# Patient Record
Sex: FEMALE | Race: WHITE
[De-identification: names, ages, dates, MRNs, and addresses within clinical notes are randomized per-mention and may not be internally consistent; named-entity substitution may affect disease eponyms.]

---

## 2019-03-13 ENCOUNTER — HOSPITAL ENCOUNTER (OUTPATIENT)
Dept: HOSPITAL 92 - BICMAMMO | Age: 74
Discharge: HOME | End: 2019-03-13
Payer: MEDICARE

## 2019-03-13 DIAGNOSIS — Z12.31: Primary | ICD-10-CM

## 2019-03-13 PROCEDURE — 77063 BREAST TOMOSYNTHESIS BI: CPT

## 2019-03-13 PROCEDURE — 77067 SCR MAMMO BI INCL CAD: CPT

## 2019-04-11 NOTE — MMO
Bilateral MAMMO Bilat Screen [facility].

 

CLINICAL HISTORY:

Patient is 74 years old and is seen for screening. The patient has no family

history of breast cancer.  The patient has no personal history of cancer. The

patient has a history of right Excisional Biopsy in 

2000 - benign.

 

VIEWS:

The views performed were:  bilateral craniocaudal with tomosynthesis and

bilateral mediolateral oblique with tomosynthesis.

 

FILMS COMPARED:

The present examination has been compared to a prior imaging study performed at

[facility] on 08/30/2010.

 

MAMMOGRAM FINDINGS:

The breasts are heterogeneously dense, which could obscure a lesion on

mammography.

 

There are stable benign appearing calcifications seen in both breasts.

 

There are no suspicious masses, suspicious calcifications, or new areas of

architectural distortion.

 

IMPRESSION:

THERE IS NO MAMMOGRAPHIC EVIDENCE OF MALIGNANCY.

 

A ROUTINE FOLLOW-UP MAMMOGRAM IN 1 YEAR IS RECOMMENDED.

 

THE RESULTS OF THIS EXAM WERE SENT TO THE PATIENT.

 

ACR BI-RADS Category 2 - Benign finding

 

MAMMOGRAPHY NOTE:

 1. A negative mammogram report should not delay a biopsy if a dominant of

 clinically suspicious mass is present.

 2. Approximately 10% to 15% of breast cancers are not detected by

 mammography.

 3. Adenosis and dense breasts may obscure an underlying neoplasm.

## 2020-03-02 ENCOUNTER — HOSPITAL ENCOUNTER (EMERGENCY)
Dept: HOSPITAL 18 - NAV ERS | Age: 75
LOS: 1 days | Discharge: HOME | End: 2020-03-03
Payer: MEDICARE

## 2020-03-02 DIAGNOSIS — L50.0: Primary | ICD-10-CM

## 2020-03-02 DIAGNOSIS — E03.9: ICD-10-CM

## 2020-03-02 DIAGNOSIS — I10: ICD-10-CM

## 2020-03-02 DIAGNOSIS — E11.9: ICD-10-CM

## 2020-03-02 PROCEDURE — 99282 EMERGENCY DEPT VISIT SF MDM: CPT

## 2020-10-08 ENCOUNTER — HOSPITAL ENCOUNTER (OUTPATIENT)
Dept: HOSPITAL 92 - BICULT | Age: 75
End: 2020-10-08
Payer: MEDICARE

## 2020-10-08 DIAGNOSIS — C50.311: Primary | ICD-10-CM

## 2020-10-08 PROCEDURE — 0H9T3ZX DRAINAGE OF RIGHT BREAST, PERCUTANEOUS APPROACH, DIAGNOSTIC: ICD-10-PCS

## 2020-10-08 PROCEDURE — 19084 BX BREAST ADD LESION US IMAG: CPT

## 2020-10-08 PROCEDURE — 88305 TISSUE EXAM BY PATHOLOGIST: CPT

## 2020-10-08 PROCEDURE — 88342 IMHCHEM/IMCYTCHM 1ST ANTB: CPT

## 2020-10-08 PROCEDURE — 19083 BX BREAST 1ST LESION US IMAG: CPT

## 2020-10-08 PROCEDURE — 88341 IMHCHEM/IMCYTCHM EA ADD ANTB: CPT

## 2020-10-08 NOTE — MMO
MAMMOGRAM FINDINGS:

The breast is heterogeneously dense, which could obscure a lesion on mammography.

 

Inner lower Left breast

 

IMPRESSION:

FINDING IN THE RIGHT BREAST IS CONFIRMED UTILIZING POST PROCEDURE MAMMOGRAM.

 

 

Reported by: CUCA GALLOWAY MD

Electonically Signed: [id number]

## 2020-10-08 NOTE — ULT
US Breast Bx US Guided



History: Breast masses



Comparison: Right breast ultrasound 9/28/2020



Findings: Patient was brought to the ultrasound suite. All questions were answered. Informed consent 
was obtained. Timeout performed.



Right breast was prepped and draped in normal sterile fashion. 3 mL instilled into the superficial an
d deep soft tissues adjacent to both upper inner quadrant masses.



The first biopsy was of the smaller breast mass 3-4:00 2 cm from the nipple. Total of 3 cores were ob
tained. A clip was placed and it is the " R" clip.



Next the larger mass retroareolar region was biopsy with a total of 3 cores obtained. A clip was plac
ed and it is the "S" clip.



Impression: Technically successful ultrasound-guided right breast mass biopsy x2.



Reported By: Yang Avery 

Electronically Signed:  10/8/2020 2:54 PM

## 2020-11-02 ENCOUNTER — HOSPITAL ENCOUNTER (OUTPATIENT)
Dept: HOSPITAL 92 - LABBT | Age: 75
Discharge: HOME | End: 2020-11-02
Attending: SURGERY
Payer: MEDICARE

## 2020-11-02 DIAGNOSIS — Z01.818: Primary | ICD-10-CM

## 2020-11-02 DIAGNOSIS — C50.919: ICD-10-CM

## 2020-11-02 DIAGNOSIS — Z20.828: ICD-10-CM

## 2020-11-02 LAB
ANION GAP SERPL CALC-SCNC: 16 MMOL/L (ref 10–20)
BASOPHILS # BLD AUTO: 0.1 10X3/UL (ref 0–0.2)
BASOPHILS NFR BLD AUTO: 0.9 % (ref 0–2)
BUN SERPL-MCNC: 15 MG/DL (ref 9.8–20.1)
CALCIUM SERPL-MCNC: 9.8 MG/DL (ref 7.8–10.44)
CHLORIDE SERPL-SCNC: 103 MMOL/L (ref 98–107)
CO2 SERPL-SCNC: 26 MMOL/L (ref 23–31)
CREAT CL PREDICTED SERPL C-G-VRATE: 0 ML/MIN (ref 70–130)
EOSINOPHIL # BLD AUTO: 0.1 10X3/UL (ref 0–0.5)
EOSINOPHIL NFR BLD AUTO: 1.6 % (ref 0–6)
GLUCOSE SERPL-MCNC: 91 MG/DL (ref 83–110)
HGB BLD-MCNC: 13.1 G/DL (ref 12–16)
LYMPHOCYTES NFR BLD AUTO: 16.5 % (ref 18–47)
MCH RBC QN AUTO: 29.9 PG (ref 27–33)
MCV RBC AUTO: 93.2 FL (ref 80–100)
MONOCYTES # BLD AUTO: 0.7 10X3/UL (ref 0–1.1)
MONOCYTES NFR BLD AUTO: 7.9 % (ref 0–10)
NEUTROPHILS # BLD AUTO: 6.3 10X3/UL (ref 1.5–8.4)
NEUTROPHILS NFR BLD AUTO: 72.8 % (ref 40–75)
PLATELET # BLD AUTO: 340 10X3/UL (ref 130–400)
POTASSIUM SERPL-SCNC: 4.1 MMOL/L (ref 3.5–5.1)
RBC # BLD AUTO: 4.38 10X6/UL (ref 3.9–5.2)
SODIUM SERPL-SCNC: 141 MMOL/L (ref 136–145)
WBC # BLD AUTO: 8.7 10X3/UL (ref 4.5–11)

## 2020-11-02 PROCEDURE — 87635 SARS-COV-2 COVID-19 AMP PRB: CPT

## 2020-11-02 PROCEDURE — 93010 ELECTROCARDIOGRAM REPORT: CPT

## 2020-11-02 PROCEDURE — 80048 BASIC METABOLIC PNL TOTAL CA: CPT

## 2020-11-02 PROCEDURE — 93005 ELECTROCARDIOGRAM TRACING: CPT

## 2020-11-02 PROCEDURE — U0003 INFECTIOUS AGENT DETECTION BY NUCLEIC ACID (DNA OR RNA); SEVERE ACUTE RESPIRATORY SYNDROME CORONAVIRUS 2 (SARS-COV-2) (CORONAVIRUS DISEASE [COVID-19]), AMPLIFIED PROBE TECHNIQUE, MAKING USE OF HIGH THROUGHPUT TECHNOLOGIES AS DESCRIBED BY CMS-2020-01-R: HCPCS

## 2020-11-02 PROCEDURE — 85025 COMPLETE CBC W/AUTO DIFF WBC: CPT

## 2020-11-02 NOTE — EKG
Test Reason : PREOP

Blood Pressure : ***/*** mmHG

Vent. Rate : 078 BPM     Atrial Rate : 078 BPM

   P-R Int : 158 ms          QRS Dur : 072 ms

    QT Int : 376 ms       P-R-T Axes : 080 073 069 degrees

   QTc Int : 428 ms

 

Normal sinus rhythm with sinus arrhythmia

Normal ECG

No previous ECGs available

Confirmed by RAFAELA SHIELDS ([id number]) on 11/2/2020 9:08:27 PM

 

Referred By:  INDIRA MONROY           Confirmed By:RAFAELA SHIELDS

## 2020-11-05 ENCOUNTER — HOSPITAL ENCOUNTER (OUTPATIENT)
Dept: HOSPITAL 92 - SDC | Age: 75
Discharge: HOME | End: 2020-11-05
Attending: SURGERY
Payer: MEDICARE

## 2020-11-05 VITALS — BODY MASS INDEX: 27.9 KG/M2

## 2020-11-05 DIAGNOSIS — Z79.84: ICD-10-CM

## 2020-11-05 DIAGNOSIS — Z17.0: ICD-10-CM

## 2020-11-05 DIAGNOSIS — D24.1: ICD-10-CM

## 2020-11-05 DIAGNOSIS — Z79.82: ICD-10-CM

## 2020-11-05 DIAGNOSIS — Z79.899: ICD-10-CM

## 2020-11-05 DIAGNOSIS — E03.9: ICD-10-CM

## 2020-11-05 DIAGNOSIS — I10: ICD-10-CM

## 2020-11-05 DIAGNOSIS — E11.9: ICD-10-CM

## 2020-11-05 DIAGNOSIS — Z87.891: ICD-10-CM

## 2020-11-05 DIAGNOSIS — C50.911: Primary | ICD-10-CM

## 2020-11-05 DIAGNOSIS — N60.91: ICD-10-CM

## 2020-11-05 PROCEDURE — 88342 IMHCHEM/IMCYTCHM 1ST ANTB: CPT

## 2020-11-05 PROCEDURE — 38525 BIOPSY/REMOVAL LYMPH NODES: CPT

## 2020-11-05 PROCEDURE — 07B50ZX EXCISION OF RIGHT AXILLARY LYMPHATIC, OPEN APPROACH, DIAGNOSTIC: ICD-10-PCS | Performed by: SURGERY

## 2020-11-05 PROCEDURE — 88341 IMHCHEM/IMCYTCHM EA ADD ANTB: CPT

## 2020-11-05 PROCEDURE — 88307 TISSUE EXAM BY PATHOLOGIST: CPT

## 2020-11-05 PROCEDURE — 19281 PERQ DEVICE BREAST 1ST IMAG: CPT

## 2020-11-05 PROCEDURE — 0HBT0ZZ EXCISION OF RIGHT BREAST, OPEN APPROACH: ICD-10-PCS | Performed by: SURGERY

## 2020-11-05 PROCEDURE — 38900 IO MAP OF SENT LYMPH NODE: CPT

## 2020-11-05 PROCEDURE — 78195 LYMPH SYSTEM IMAGING: CPT

## 2020-11-05 PROCEDURE — A9541 TC99M SULFUR COLLOID: HCPCS

## 2020-11-05 PROCEDURE — S0020 INJECTION, BUPIVICAINE HYDRO: HCPCS

## 2020-11-05 PROCEDURE — 19282 PERQ DEVICE BREAST EA IMAG: CPT

## 2020-11-05 PROCEDURE — 19301 PARTIAL MASTECTOMY: CPT

## 2020-11-05 PROCEDURE — 76098 X-RAY EXAM SURGICAL SPECIMEN: CPT

## 2020-11-05 NOTE — MMO
Surgical specimen mammography



HISTORY: Right breast cancer.



FINDINGS: Mammographic evaluation of the surgical specimen obtained by Dr. Yadav shows one of the l
ocalization wires and the ribbon shaped metallic clip, along with calcifications to overlie the

specimen tissue. The S shaped clip is not visible in this specimen radiograph.



Findings were called to Dr. Yadav in the OR at time of the exam.



Reported By: INDIRA Ramirez 

Electronically Signed:  11/5/2020 12:02 PM

## 2020-11-05 NOTE — MMO
Mammographic guided Needle localization right breast cancer x2



HISTORY: Right breast cancer.



FINDINGS: After explaining the procedure and answering all questions, the localization clips associat
ed with recent right breast biopsy were visualized from an inferior approach.



Sterile technique, buffered local anesthesia, mammographic guidance, and an anterior approach were us
ed to carefully advance a 5 cm [place] needle and wire immediately adjacent to the more medial and

less inferior localization clip (ribbon). Wire was deployed to hold position.



A new 3 cm [place] needle and wire and buffered local anesthesia were placed immediately adjacent to th
e less medial and more inferior localization clip (S).



Final images were marked. Patient tolerated the procedure well and was transferred to day surgery in 
good condition.



  



IMPRESSION :

Technically successful needle localization right breast masses and localization clips.



Reported By: INDIRA Ramirez 

Electronically Signed:  11/5/2020 8:56 AM

## 2020-11-06 NOTE — NM
Lymphoscintigraphy right breast



HISTORY: Right breast cancer.



FINDINGS: A total volume of 1 cc containing 404 uCi technetium 99m filtered sulfur colloid was inject
ed into the skin at the 12:00, 3:00, 6:00, and 9:00 periareolar position of the right breast.

Injection sites were massaged by the patient. Immediate imaging shows focal area of uptake at the axi
llary tail of the right breast. Slightly less intense (3) foci were seen immediately anterior and

medial to the dominant sentinel node.



Patient tolerated the procedure well and was transferred to day surgery in good condition.



  



IMPRESSION :

Technically successful right breast lymphoscintigraphy, revealing lymph nodes at the axillary tail of
 the right breast.



Reported By: INDIRA Ramirez 

Electronically Signed:  11/5/2020 8:58 AM

## 2020-11-06 NOTE — OP
DATE OF PROCEDURE:  11/05/2020



PREOPERATIVE DIAGNOSIS:  Right breast cancer.



POSTOPERATIVE DIAGNOSIS:  Right breast cancer.



PROCEDURES PERFORMED:  

1. Right breast partial mastectomy after needle localization.

2. Right axillary sentinel node biopsy (sentinel node protocol).



ANESTHESIA:  General.



ESTIMATED BLOOD LOSS:  Minimal.



COMPLICATIONS:  None.



SPECIMEN:  The breast mass was marked with 2 short superior and one long lateral and

sent to Path for final diagnosis.  Additional margins were sent superiorly,

inferiorly, anterior, posterior, medial, and lateral. 



DESCRIPTION OF PROCEDURE:  The patient had undergone preop placement of needle

localization wire x2 as well as lymphoscintigraphy was performed showing uptake in

the right axilla.  The patient was then taken to the operating room and after

general anesthetic, 5 mL of methylene blue dye was infiltrated on the right nipple

and massaged for 5 minutes.  The right breast, axilla, arm were all prepped and

draped in a sterile fashion.  An incision was made in the inferior hairline of the

right axilla.  Neoprobe was used to find an area of increased uptake.  A blue node

was removed.  It had high counts on the back table.  Background counts dropped to

near zero.  This was the sentinel node.  This wound was irrigated and closed using

3-0 Vicryl, 4-0 Monocryl, and Dermabond. 



The incision was made along the medial aspect of the areola on the patient's right

breast.  Flaps were raised superiorly, inferolaterally, posteriorly to the 2 needle

localization wires.  Specimen x-ray revealed the clip present in the middle of the

abnormality to be present.  The clip on the periphery was not and this would

correspond to the anterior margin.  Additional margins were then taken on all sides

of the tumor excision including anteriorly leaving really only dermis and a small

amount of subcutaneous fat anteriorly.  All these margins were marked with stitch on

new margin and sent to Path for final diagnosis.  The wound was irrigated.  There

was no bleeding.  Local anesthetic was applied and the wound was closed using 3-0

Vicryl, 4-0 Monocryl, and Dermabond.  The patient was sent to Recovery in stable

condition.  All instrument counts, needle counts, and lap counts were correct. 







Job ID:  [id number]

## 2020-11-25 ENCOUNTER — HOSPITAL ENCOUNTER (OUTPATIENT)
Dept: HOSPITAL 92 - LABBT | Age: 75
Discharge: HOME | End: 2020-11-25
Attending: SURGERY
Payer: MEDICARE

## 2020-11-25 DIAGNOSIS — Z20.828: ICD-10-CM

## 2020-11-25 DIAGNOSIS — C50.919: ICD-10-CM

## 2020-11-25 DIAGNOSIS — Z01.812: Primary | ICD-10-CM

## 2020-11-25 PROCEDURE — U0003 INFECTIOUS AGENT DETECTION BY NUCLEIC ACID (DNA OR RNA); SEVERE ACUTE RESPIRATORY SYNDROME CORONAVIRUS 2 (SARS-COV-2) (CORONAVIRUS DISEASE [COVID-19]), AMPLIFIED PROBE TECHNIQUE, MAKING USE OF HIGH THROUGHPUT TECHNOLOGIES AS DESCRIBED BY CMS-2020-01-R: HCPCS

## 2020-11-25 PROCEDURE — 87635 SARS-COV-2 COVID-19 AMP PRB: CPT

## 2020-11-30 ENCOUNTER — HOSPITAL ENCOUNTER (OUTPATIENT)
Dept: HOSPITAL 92 - SDC | Age: 75
Discharge: HOME | End: 2020-11-30
Attending: SURGERY
Payer: MEDICARE

## 2020-11-30 VITALS — BODY MASS INDEX: 27.1 KG/M2

## 2020-11-30 DIAGNOSIS — Z79.84: ICD-10-CM

## 2020-11-30 DIAGNOSIS — Z85.3: ICD-10-CM

## 2020-11-30 DIAGNOSIS — E03.9: ICD-10-CM

## 2020-11-30 DIAGNOSIS — Z79.899: ICD-10-CM

## 2020-11-30 DIAGNOSIS — E11.9: ICD-10-CM

## 2020-11-30 DIAGNOSIS — I10: ICD-10-CM

## 2020-11-30 DIAGNOSIS — Z79.82: ICD-10-CM

## 2020-11-30 DIAGNOSIS — Z87.891: ICD-10-CM

## 2020-11-30 DIAGNOSIS — N60.31: Primary | ICD-10-CM

## 2020-11-30 DIAGNOSIS — N60.91: ICD-10-CM

## 2020-11-30 PROCEDURE — 88307 TISSUE EXAM BY PATHOLOGIST: CPT

## 2020-11-30 PROCEDURE — 0HBT0ZZ EXCISION OF RIGHT BREAST, OPEN APPROACH: ICD-10-PCS | Performed by: SURGERY

## 2020-11-30 PROCEDURE — S0020 INJECTION, BUPIVICAINE HYDRO: HCPCS

## 2020-12-01 NOTE — OP
DATE OF PROCEDURE:  11/30/2020



PREOPERATIVE DIAGNOSIS:  Right breast cancer with positive anterior and inferior

margins. 



POSTOPERATIVE DIAGNOSIS:  Right breast cancer with positive anterior and inferior

margins. 



PROCEDURE PERFORMED:  Reexcision margin lumpectomy.



ANESTHESIA:  General.



ESTIMATED BLOOD LOSS:  Minimal.



COMPLICATIONS:  None.



SPECIMENS:  Anterior margin with stitch on new margin, inferior margin with stitch

on new margin. 



BRIEF HISTORY:  Patient is a 75-year-old female who is status post right partial

mastectomy and sentinel node biopsy.  She was found to have a 1.8 cm tumor in her

right breast.  She had involvement of the both the anterior and inferior margins on

that lumpectomy.  She is being taken back for re-excision of these margins. 



TECHNIQUE:  The patient was taken to the operating room and laid supine on the

operating room table.  After general anesthetic was obtained, the right breast was

prepped and draped in a sterile fashion.  The previous circumareolar incision was

reopened.  There is sufficient anterior tissue to be taken as an additional margin.

This was taken to the level of the dermis down into the biopsy cavity.  A stitch was

placed on 

new margin, sent to Path for final diagnosis.  An additional inferior margin was

taken in the same manner with stitch on new margin.  The wound was irrigated.  Local

anesthetic was applied and the breast incision was reclosed using 3-0 Vicryl, 4-0

Monocryl, and Dermabond.  The patient was sent to Recovery in stable condition.  All

instrument counts, needle counts, and lap counts were correct. 





Job ID:  [id number]

## 2021-06-30 ENCOUNTER — HOSPITAL ENCOUNTER (OUTPATIENT)
Dept: HOSPITAL 92 - BICMAMMO | Age: 76
Discharge: HOME | End: 2021-06-30
Attending: INTERNAL MEDICINE
Payer: MEDICARE

## 2021-06-30 DIAGNOSIS — Z78.0: ICD-10-CM

## 2021-06-30 DIAGNOSIS — T38.6X5A: ICD-10-CM

## 2021-06-30 DIAGNOSIS — Z13.820: Primary | ICD-10-CM

## 2021-06-30 DIAGNOSIS — M85.89: ICD-10-CM

## 2021-06-30 PROCEDURE — 77080 DXA BONE DENSITY AXIAL: CPT

## 2021-08-26 ENCOUNTER — HOSPITAL ENCOUNTER (EMERGENCY)
Dept: HOSPITAL 18 - NAV ERS | Age: 76
Discharge: HOME | End: 2021-08-26
Payer: MEDICARE

## 2021-08-26 DIAGNOSIS — I10: ICD-10-CM

## 2021-08-26 DIAGNOSIS — Z79.82: ICD-10-CM

## 2021-08-26 DIAGNOSIS — Z79.84: ICD-10-CM
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
## 2021-08-26 DIAGNOSIS — E11.9: ICD-10-CM

## 2021-08-26 DIAGNOSIS — E03.9: ICD-10-CM

## 2021-08-26 DIAGNOSIS — Z79.01: ICD-10-CM

## 2021-08-26 DIAGNOSIS — Z85.3: ICD-10-CM

## 2021-08-26 DIAGNOSIS — S32.019A: Primary | ICD-10-CM

## 2021-08-26 DIAGNOSIS — Z79.899: ICD-10-CM

## 2021-08-26 PROCEDURE — 72170 X-RAY EXAM OF PELVIS: CPT

## 2021-08-26 PROCEDURE — 36416 COLLJ CAPILLARY BLOOD SPEC: CPT

## 2021-08-26 PROCEDURE — 72100 X-RAY EXAM L-S SPINE 2/3 VWS: CPT

## 2021-09-01 ENCOUNTER — HOSPITAL ENCOUNTER (INPATIENT)
Dept: HOSPITAL 18 - NAV ACUTE | Age: 76
LOS: 2 days | Discharge: ANOTHER HOSPITAL | DRG: 552 | End: 2021-09-03
Attending: STUDENT IN AN ORGANIZED HEALTH CARE EDUCATION/TRAINING PROGRAM | Admitting: STUDENT IN AN ORGANIZED HEALTH CARE EDUCATION/TRAINING PROGRAM
Payer: MEDICARE

## 2021-09-01 VITALS — BODY MASS INDEX: 28.9 KG/M2

## 2021-09-01 DIAGNOSIS — E03.9: ICD-10-CM
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
## 2021-09-01 DIAGNOSIS — S32.019A: Primary | ICD-10-CM

## 2021-09-01 DIAGNOSIS — M85.80: ICD-10-CM

## 2021-09-01 DIAGNOSIS — Z79.84: ICD-10-CM

## 2021-09-01 DIAGNOSIS — C50.919: ICD-10-CM

## 2021-09-01 DIAGNOSIS — E11.9: ICD-10-CM
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
## 2021-09-01 DIAGNOSIS — Z20.822: ICD-10-CM

## 2021-09-01 DIAGNOSIS — F41.9: ICD-10-CM

## 2021-09-01 DIAGNOSIS — F32.9: ICD-10-CM

## 2021-09-01 DIAGNOSIS — Z79.899: ICD-10-CM

## 2021-09-01 DIAGNOSIS — Z98.890: ICD-10-CM
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
## 2021-09-01 DIAGNOSIS — I10: ICD-10-CM

## 2021-09-01 PROCEDURE — U0002 COVID-19 LAB TEST NON-CDC: HCPCS

## 2021-09-01 PROCEDURE — 80048 BASIC METABOLIC PNL TOTAL CA: CPT

## 2021-09-01 PROCEDURE — 36416 COLLJ CAPILLARY BLOOD SPEC: CPT

## 2021-09-01 PROCEDURE — 85025 COMPLETE CBC W/AUTO DIFF WBC: CPT

## 2021-09-01 PROCEDURE — 83735 ASSAY OF MAGNESIUM: CPT

## 2021-09-01 PROCEDURE — 72131 CT LUMBAR SPINE W/O DYE: CPT

## 2021-09-01 RX ADMIN — HYDROCODONE BITARTRATE AND ACETAMINOPHEN PRN TAB: 5; 325 TABLET ORAL at 23:52

## 2021-09-01 RX ADMIN — HYDROCODONE BITARTRATE AND ACETAMINOPHEN PRN TAB: 5; 325 TABLET ORAL at 16:10

## 2021-09-02 LAB
ANION GAP SERPL CALC-SCNC: 14 MMOL/L (ref 10–20)
ANION GAP SERPL CALC-SCNC: 16 MMOL/L (ref 10–20)
BASOPHILS # BLD AUTO: 0.1 THOU/UL (ref 0–0.2)
BASOPHILS NFR BLD AUTO: 1.6 % (ref 0–1)
BUN SERPL-MCNC: 13 MG/DL (ref 9.8–20.1)
BUN SERPL-MCNC: 16 MG/DL (ref 9.8–20.1)
CALCIUM SERPL-MCNC: 9.3 MG/DL (ref 7.8–10.44)
CALCIUM SERPL-MCNC: 9.5 MG/DL (ref 7.8–10.44)
CHLORIDE SERPL-SCNC: 94 MMOL/L (ref 98–107)
CHLORIDE SERPL-SCNC: 95 MMOL/L (ref 98–107)
CO2 SERPL-SCNC: 29 MMOL/L (ref 23–31)
CO2 SERPL-SCNC: 29 MMOL/L (ref 23–31)
CREAT CL PREDICTED SERPL C-G-VRATE: 81 ML/MIN (ref 70–130)
CREAT CL PREDICTED SERPL C-G-VRATE: 95 ML/MIN (ref 70–130)
EOSINOPHIL # BLD AUTO: 0.2 THOU/UL (ref 0–0.7)
EOSINOPHIL NFR BLD AUTO: 2.2 % (ref 0–10)
GLUCOSE SERPL-MCNC: 105 MG/DL (ref 83–110)
GLUCOSE SERPL-MCNC: 136 MG/DL (ref 83–110)
HGB BLD-MCNC: 12.8 G/DL (ref 12–16)
LYMPHOCYTES # BLD AUTO: 1.2 THOU/UL (ref 1.2–3.4)
LYMPHOCYTES NFR BLD AUTO: 14.1 % (ref 21–51)
MAGNESIUM SERPL-MCNC: 1.6 MG/DL (ref 1.6–2.6)
MCH RBC QN AUTO: 30.1 PG (ref 27–31)
MCV RBC AUTO: 94.3 FL (ref 78–98)
MONOCYTES # BLD AUTO: 0.7 THOU/UL (ref 0.11–0.59)
MONOCYTES NFR BLD AUTO: 8.1 % (ref 0–10)
NEUTROPHILS # BLD AUTO: 6.2 THOU/UL (ref 1.4–6.5)
NEUTROPHILS NFR BLD AUTO: 74 % (ref 42–75)
PLATELET # BLD AUTO: 307 THOU/UL (ref 130–400)
POTASSIUM SERPL-SCNC: 2.7 MMOL/L (ref 3.5–5.1)
POTASSIUM SERPL-SCNC: 4.2 MMOL/L (ref 3.5–5.1)
RBC # BLD AUTO: 4.25 MILL/UL (ref 4.2–5.4)
SODIUM SERPL-SCNC: 134 MMOL/L (ref 136–145)
SODIUM SERPL-SCNC: 136 MMOL/L (ref 136–145)
WBC # BLD AUTO: 8.4 THOU/UL (ref 4.8–10.8)

## 2021-09-02 RX ADMIN — ACETAMINOPHEN AND CODEINE PHOSPHATE PRN TAB: 300; 30 TABLET ORAL at 21:26

## 2021-09-02 RX ADMIN — HYDROCODONE BITARTRATE AND ACETAMINOPHEN PRN TAB: 5; 325 TABLET ORAL at 08:51

## 2021-09-02 RX ADMIN — HYDROCODONE BITARTRATE AND ACETAMINOPHEN PRN TAB: 5; 325 TABLET ORAL at 15:28

## 2021-09-03 ENCOUNTER — HOSPITAL ENCOUNTER (INPATIENT)
Dept: HOSPITAL 18 - NAV ACUTE | Age: 76
LOS: 7 days | Discharge: HOME HEALTH | DRG: 552 | End: 2021-09-10
Attending: STUDENT IN AN ORGANIZED HEALTH CARE EDUCATION/TRAINING PROGRAM | Admitting: STUDENT IN AN ORGANIZED HEALTH CARE EDUCATION/TRAINING PROGRAM
Payer: MEDICARE

## 2021-09-03 VITALS — DIASTOLIC BLOOD PRESSURE: 74 MMHG | SYSTOLIC BLOOD PRESSURE: 124 MMHG | TEMPERATURE: 98.7 F

## 2021-09-03 VITALS — BODY MASS INDEX: 28.5 KG/M2

## 2021-09-03 DIAGNOSIS — S32.019A: Primary | ICD-10-CM

## 2021-09-03 DIAGNOSIS — Z98.890: ICD-10-CM

## 2021-09-03 DIAGNOSIS — Z20.822: ICD-10-CM

## 2021-09-03 DIAGNOSIS — E11.9: ICD-10-CM

## 2021-09-03 DIAGNOSIS — F32.9: ICD-10-CM

## 2021-09-03 DIAGNOSIS — F41.9: ICD-10-CM

## 2021-09-03 DIAGNOSIS — I10: ICD-10-CM

## 2021-09-03 DIAGNOSIS — Z79.84: ICD-10-CM

## 2021-09-03 DIAGNOSIS — C50.911: ICD-10-CM
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
## 2021-09-03 DIAGNOSIS — Z79.899: ICD-10-CM

## 2021-09-03 DIAGNOSIS — R53.1: ICD-10-CM

## 2021-09-03 DIAGNOSIS — E03.9: ICD-10-CM

## 2021-09-03 PROCEDURE — 36415 COLL VENOUS BLD VENIPUNCTURE: CPT

## 2021-09-03 PROCEDURE — 80048 BASIC METABOLIC PNL TOTAL CA: CPT

## 2021-09-03 PROCEDURE — U0005 INFEC AGEN DETEC AMPLI PROBE: HCPCS

## 2021-09-03 PROCEDURE — 36416 COLLJ CAPILLARY BLOOD SPEC: CPT

## 2021-09-03 PROCEDURE — U0003 INFECTIOUS AGENT DETECTION BY NUCLEIC ACID (DNA OR RNA); SEVERE ACUTE RESPIRATORY SYNDROME CORONAVIRUS 2 (SARS-COV-2) (CORONAVIRUS DISEASE [COVID-19]), AMPLIFIED PROBE TECHNIQUE, MAKING USE OF HIGH THROUGHPUT TECHNOLOGIES AS DESCRIBED BY CMS-2020-01-R: HCPCS

## 2021-09-03 PROCEDURE — 85025 COMPLETE CBC W/AUTO DIFF WBC: CPT

## 2021-09-03 RX ADMIN — ACETAMINOPHEN AND CODEINE PHOSPHATE PRN TAB: 300; 30 TABLET ORAL at 21:04

## 2021-09-04 LAB
ANION GAP SERPL CALC-SCNC: 14 MMOL/L (ref 10–20)
BASOPHILS # BLD AUTO: 0.1 THOU/UL (ref 0–0.2)
BASOPHILS NFR BLD AUTO: 1.7 % (ref 0–1)
BUN SERPL-MCNC: 17 MG/DL (ref 9.8–20.1)
CALCIUM SERPL-MCNC: 9.5 MG/DL (ref 7.8–10.44)
CHLORIDE SERPL-SCNC: 98 MMOL/L (ref 98–107)
CO2 SERPL-SCNC: 30 MMOL/L (ref 23–31)
CREAT CL PREDICTED SERPL C-G-VRATE: 92 ML/MIN (ref 70–130)
EOSINOPHIL # BLD AUTO: 0.2 THOU/UL (ref 0–0.7)
EOSINOPHIL NFR BLD AUTO: 2.8 % (ref 0–10)
GLUCOSE SERPL-MCNC: 121 MG/DL (ref 83–110)
HGB BLD-MCNC: 13 G/DL (ref 12–16)
LYMPHOCYTES # BLD AUTO: 1.6 THOU/UL (ref 1.2–3.4)
LYMPHOCYTES NFR BLD AUTO: 18.3 % (ref 21–51)
MCH RBC QN AUTO: 29.7 PG (ref 27–31)
MCV RBC AUTO: 95.4 FL (ref 78–98)
MONOCYTES # BLD AUTO: 0.7 THOU/UL (ref 0.11–0.59)
MONOCYTES NFR BLD AUTO: 8.4 % (ref 0–10)
NEUTROPHILS # BLD AUTO: 6 THOU/UL (ref 1.4–6.5)
NEUTROPHILS NFR BLD AUTO: 68.8 % (ref 42–75)
PLATELET # BLD AUTO: 335 THOU/UL (ref 130–400)
POTASSIUM SERPL-SCNC: 3.6 MMOL/L (ref 3.5–5.1)
RBC # BLD AUTO: 4.37 MILL/UL (ref 4.2–5.4)
SODIUM SERPL-SCNC: 138 MMOL/L (ref 136–145)
WBC # BLD AUTO: 8.7 THOU/UL (ref 4.8–10.8)

## 2021-09-04 RX ADMIN — ACETAMINOPHEN AND CODEINE PHOSPHATE PRN TAB: 300; 30 TABLET ORAL at 15:12

## 2021-09-05 RX ADMIN — ACETAMINOPHEN AND CODEINE PHOSPHATE PRN TAB: 300; 30 TABLET ORAL at 22:23

## 2021-09-05 RX ADMIN — ACETAMINOPHEN AND CODEINE PHOSPHATE PRN TAB: 300; 30 TABLET ORAL at 14:05

## 2021-09-05 RX ADMIN — ACETAMINOPHEN AND CODEINE PHOSPHATE PRN TAB: 300; 30 TABLET ORAL at 01:17

## 2021-09-06 RX ADMIN — ACETAMINOPHEN AND CODEINE PHOSPHATE PRN TAB: 300; 30 TABLET ORAL at 12:04

## 2021-09-06 RX ADMIN — ACETAMINOPHEN AND CODEINE PHOSPHATE PRN TAB: 300; 30 TABLET ORAL at 21:59

## 2021-09-07 RX ADMIN — ACETAMINOPHEN AND CODEINE PHOSPHATE PRN TAB: 300; 30 TABLET ORAL at 20:27

## 2021-09-07 RX ADMIN — ACETAMINOPHEN AND CODEINE PHOSPHATE PRN TAB: 300; 30 TABLET ORAL at 09:01

## 2021-09-08 RX ADMIN — ACETAMINOPHEN AND CODEINE PHOSPHATE PRN TAB: 300; 30 TABLET ORAL at 08:23

## 2021-09-08 RX ADMIN — ACETAMINOPHEN AND CODEINE PHOSPHATE PRN TAB: 300; 30 TABLET ORAL at 20:20

## 2021-09-09 RX ADMIN — ACETAMINOPHEN AND CODEINE PHOSPHATE PRN TAB: 300; 30 TABLET ORAL at 13:51

## 2021-09-10 VITALS — TEMPERATURE: 98.2 F

## 2021-09-10 VITALS — SYSTOLIC BLOOD PRESSURE: 116 MMHG | DIASTOLIC BLOOD PRESSURE: 70 MMHG

## 2021-09-10 RX ADMIN — ACETAMINOPHEN AND CODEINE PHOSPHATE PRN TAB: 300; 30 TABLET ORAL at 04:26

## 2021-09-10 RX ADMIN — ACETAMINOPHEN AND CODEINE PHOSPHATE PRN TAB: 300; 30 TABLET ORAL at 13:02

## 2022-04-27 ENCOUNTER — HOSPITAL ENCOUNTER (OUTPATIENT)
Dept: HOSPITAL 92 - BICMAMMO | Age: 77
Discharge: HOME | End: 2022-04-27
Attending: INTERNAL MEDICINE
Payer: MEDICARE

## 2022-04-27 DIAGNOSIS — C50.911: Primary | ICD-10-CM

## 2022-04-27 PROCEDURE — G0279 TOMOSYNTHESIS, MAMMO: HCPCS

## 2022-04-27 PROCEDURE — 77066 DX MAMMO INCL CAD BI: CPT

## 2022-07-06 ENCOUNTER — HOSPITAL ENCOUNTER (OUTPATIENT)
Dept: HOSPITAL 92 - BICMAMMO | Age: 77
Discharge: HOME | End: 2022-07-06
Attending: INTERNAL MEDICINE
Payer: MEDICARE

## 2022-07-06 DIAGNOSIS — C50.911: ICD-10-CM

## 2022-07-06 DIAGNOSIS — M85.852: ICD-10-CM

## 2022-07-06 DIAGNOSIS — M85.851: Primary | ICD-10-CM

## 2022-07-06 PROCEDURE — 77080 DXA BONE DENSITY AXIAL: CPT

## 2022-07-25 ENCOUNTER — HOSPITAL ENCOUNTER (EMERGENCY)
Dept: HOSPITAL 18 - NAV ERS | Age: 77
Discharge: HOME | End: 2022-07-25
Payer: MEDICARE

## 2022-07-25 DIAGNOSIS — Z79.899: ICD-10-CM

## 2022-07-25 DIAGNOSIS — Z79.84: ICD-10-CM

## 2022-07-25 DIAGNOSIS — I10: Primary | ICD-10-CM

## 2022-07-25 DIAGNOSIS — F41.9: ICD-10-CM

## 2022-07-25 DIAGNOSIS — E03.9: ICD-10-CM

## 2022-07-25 DIAGNOSIS — E11.9: ICD-10-CM

## 2022-07-25 PROCEDURE — 93005 ELECTROCARDIOGRAM TRACING: CPT

## 2023-04-28 ENCOUNTER — HOSPITAL ENCOUNTER (OUTPATIENT)
Dept: HOSPITAL 92 - BICMAMMO | Age: 78
Discharge: HOME | End: 2023-04-28
Attending: INTERNAL MEDICINE
Payer: MEDICARE

## 2023-04-28 DIAGNOSIS — Z85.3: ICD-10-CM

## 2023-04-28 DIAGNOSIS — Z08: Primary | ICD-10-CM

## 2023-04-28 PROCEDURE — 77066 DX MAMMO INCL CAD BI: CPT

## 2023-04-28 PROCEDURE — G0279 TOMOSYNTHESIS, MAMMO: HCPCS
